# Patient Record
Sex: FEMALE | Race: WHITE | NOT HISPANIC OR LATINO | Employment: OTHER | URBAN - METROPOLITAN AREA
[De-identification: names, ages, dates, MRNs, and addresses within clinical notes are randomized per-mention and may not be internally consistent; named-entity substitution may affect disease eponyms.]

---

## 2019-05-07 ENCOUNTER — TELEPHONE (OUTPATIENT)
Dept: ENDOCRINOLOGY | Facility: CLINIC | Age: 74
End: 2019-05-07

## 2019-05-07 ENCOUNTER — OFFICE VISIT (OUTPATIENT)
Dept: ENDOCRINOLOGY | Facility: CLINIC | Age: 74
End: 2019-05-07
Payer: COMMERCIAL

## 2019-05-07 VITALS
HEART RATE: 90 BPM | HEIGHT: 67 IN | SYSTOLIC BLOOD PRESSURE: 110 MMHG | DIASTOLIC BLOOD PRESSURE: 66 MMHG | WEIGHT: 273.13 LBS | BODY MASS INDEX: 42.87 KG/M2

## 2019-05-07 DIAGNOSIS — E78.2 MIXED DYSLIPIDEMIA: ICD-10-CM

## 2019-05-07 DIAGNOSIS — E66.01 MORBID OBESITY: ICD-10-CM

## 2019-05-07 DIAGNOSIS — E11.42 TYPE 2 DIABETES MELLITUS WITH DIABETIC POLYNEUROPATHY, WITHOUT LONG-TERM CURRENT USE OF INSULIN: Primary | ICD-10-CM

## 2019-05-07 DIAGNOSIS — E89.0 POSTABLATIVE HYPOTHYROIDISM: ICD-10-CM

## 2019-05-07 DIAGNOSIS — I10 ESSENTIAL HYPERTENSION: ICD-10-CM

## 2019-05-07 LAB — GLUCOSE SERPL-MCNC: 85 MG/DL (ref 70–110)

## 2019-05-07 PROCEDURE — 99999 PR PBB SHADOW E&M-NEW PATIENT-LVL III: ICD-10-PCS | Mod: PBBFAC,,, | Performed by: INTERNAL MEDICINE

## 2019-05-07 PROCEDURE — 3078F PR MOST RECENT DIASTOLIC BLOOD PRESSURE < 80 MM HG: ICD-10-PCS | Mod: CPTII,S$GLB,, | Performed by: INTERNAL MEDICINE

## 2019-05-07 PROCEDURE — 99204 PR OFFICE/OUTPT VISIT, NEW, LEVL IV, 45-59 MIN: ICD-10-PCS | Mod: S$GLB,,, | Performed by: INTERNAL MEDICINE

## 2019-05-07 PROCEDURE — 82962 GLUCOSE BLOOD TEST: CPT | Mod: S$GLB,,, | Performed by: INTERNAL MEDICINE

## 2019-05-07 PROCEDURE — 3074F SYST BP LT 130 MM HG: CPT | Mod: CPTII,S$GLB,, | Performed by: INTERNAL MEDICINE

## 2019-05-07 PROCEDURE — 82962 POCT GLUCOSE, HAND-HELD DEVICE: ICD-10-PCS | Mod: S$GLB,,, | Performed by: INTERNAL MEDICINE

## 2019-05-07 PROCEDURE — 3078F DIAST BP <80 MM HG: CPT | Mod: CPTII,S$GLB,, | Performed by: INTERNAL MEDICINE

## 2019-05-07 PROCEDURE — 99999 PR PBB SHADOW E&M-NEW PATIENT-LVL III: CPT | Mod: PBBFAC,,, | Performed by: INTERNAL MEDICINE

## 2019-05-07 PROCEDURE — 3074F PR MOST RECENT SYSTOLIC BLOOD PRESSURE < 130 MM HG: ICD-10-PCS | Mod: CPTII,S$GLB,, | Performed by: INTERNAL MEDICINE

## 2019-05-07 PROCEDURE — 99204 OFFICE O/P NEW MOD 45 MIN: CPT | Mod: S$GLB,,, | Performed by: INTERNAL MEDICINE

## 2019-05-07 RX ORDER — ASPIRIN 81 MG/1
81 TABLET ORAL DAILY
COMMUNITY

## 2019-05-07 RX ORDER — GLIPIZIDE 5 MG/1
5 TABLET ORAL 2 TIMES DAILY WITH MEALS
COMMUNITY

## 2019-05-07 RX ORDER — ATORVASTATIN CALCIUM 40 MG/1
40 TABLET, FILM COATED ORAL DAILY
COMMUNITY

## 2019-05-07 RX ORDER — LEVOTHYROXINE SODIUM 25 UG/1
25 TABLET ORAL
COMMUNITY

## 2019-05-07 RX ORDER — TRAMADOL HYDROCHLORIDE 50 MG/1
50 TABLET ORAL EVERY 6 HOURS PRN
COMMUNITY

## 2019-05-07 RX ORDER — LEVOTHYROXINE SODIUM 200 UG/1
200 TABLET ORAL
COMMUNITY

## 2019-05-07 RX ORDER — PANTOPRAZOLE SODIUM 40 MG/1
40 TABLET, DELAYED RELEASE ORAL DAILY
COMMUNITY

## 2019-05-07 RX ORDER — FUROSEMIDE 40 MG/1
40 TABLET ORAL DAILY
Refills: 0 | COMMUNITY
Start: 2019-04-16

## 2019-05-07 RX ORDER — ALLOPURINOL 100 MG/1
100 TABLET ORAL DAILY
COMMUNITY

## 2019-05-07 RX ORDER — METOPROLOL SUCCINATE 25 MG/1
25 TABLET, EXTENDED RELEASE ORAL DAILY
COMMUNITY

## 2019-05-07 NOTE — PROGRESS NOTES
Subjective:      Patient ID: Little Greene is a 73 y.o. female.    Chief Complaint:  Diabetes      History of Present Illness  Lives in a nursing home   She does not know why she is here  She tells she she was recently admitted to  and was transfused ... She was dx with liver cirrhosis - got 10 l drained   I don't have any records       With regards to the diabetes:    Diagnosed: 1996   Known complications:  None, that she was aware      Current regimen:  Glipizide 5 mg bid   Missed doses?  None     Failed treatments:   Does not recall being on  metformin or any other meds        # times a day testing 1-2 x day   Log reviewed:  none      Hypoglycemic event? None        Diabetes Management Status    Statin: Taking  ACE/ARB: Not taking    Screening or Prevention Patient's value Goal Complete/Controlled?   HgA1C Testing and Control   No results found for: HGBA1C   Annually/Less than 8% No   Lipid profile Most Recent Lipid Panel Health Maintenance Topic Completion: Not Found Annually No   LDL control No results found for: LDLCALC Annually/Less than 100 mg/dl  No   Nephropathy screening No results found for: LABMICR  No results found for: PROTEINUA Annually No   Blood pressure BP Readings from Last 1 Encounters:   05/07/19 110/66    Less than 140/90 Yes   Dilated retinal exam Most Recent Eye Exam Date: Not Found Annually Yes   Foot exam   Most Recent Foot Exam Date: Not Found Annually Yes       Last eye exam: > 1 year. 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   Eyes: Negative for visual disturbance.   Respiratory: Negative for shortness of breath.    Cardiovascular: Negative for chest pain.   Gastrointestinal: Negative for abdominal pain.   Musculoskeletal: Negative for arthralgias.   Skin: Negative for wound.   Neurological: Negative for headaches.   Hematological: Does not bruise/bleed easily.   Psychiatric/Behavioral: Negative for sleep disturbance.       Objective:   Physical Exam   Constitutional: She appears well-developed.   HENT:   Right Ear: External ear normal.   Left Ear: External ear normal.   Nose: Nose normal.   Hearing normal  Dentition good    Neck: No tracheal deviation present. No thyromegaly present.   Cardiovascular: Normal rate.   No murmur heard.  Pulmonary/Chest: Effort normal and breath sounds normal.   Abdominal: Soft. There is no tenderness. No hernia.   Musculoskeletal: She exhibits edema.   Gait normal  No clubbing or cyanosis noted   Neurological: She displays normal reflexes. No cranial nerve deficit.   Skin: No rash noted.   No nodules       Psychiatric: She has a normal mood and affect. Judgment normal.   Vitals reviewed.    Obese in a wheelchair  Feet no cuts or  scratches  Shoes appropriate  sensation decreased to vibration      Body mass index is 42.78 kg/m².    Lab Review:   No results found for: HGBA1C  No results found for: CHOL, HDL, LDLCALC, TRIG, CHOLHDL  No results found for: NA, K, CL, CO2, GLU, BUN, CREATININE, CALCIUM, PROT, ALBUMIN, BILITOT, ALKPHOS, AST, ALT, ANIONGAP, ESTGFRAFRICA, EGFRNONAA, TSH    Assessment and Plan     Problem List Items Addressed This Visit        Unprioritized    Type 2 diabetes mellitus with diabetic polyneuropathy, without long-term current use of insulin - Primary    Current Assessment & Plan     Reviewed goals of therapy are to get the best control we can without hypoglycemia    Refer to education  Refer to podiatry  Needs eye exam (she has her own)   Labs today     Medication  changes:  None  Low threshold to decrease RICHARDSON to qam if ANY hypos  Next meds to add consider DPP4I or incretin         -Advised occasional self blood glucose monitoring.        -Hypoglycemia precautions discussed.     -Close adherence to lifestyle changes recommended.      -Periodic follow ups for eye evaluations, foot care and dental care suggested.    rtc prn                Relevant Medications    glipiZIDE (GLUCOTROL) 5 MG tablet    Essential hypertension    Current Assessment & Plan     Controlled  Can consider ARB as next line            Mixed dyslipidemia    Current Assessment & Plan     On statin         Postablative hypothyroidism    Current Assessment & Plan     Labs today  Goal nl tsh     Check bmd            Morbid obesity    Current Assessment & Plan     Refer to education                Labs to determine if f/u needed otherwise recs as above to pcp

## 2019-05-07 NOTE — TELEPHONE ENCOUNTER
TSH is a bit too high meaning .... Defer to pcp but consider Increasing the thyroid hormone by 25 mcg daily     Cr is elevated - not sure if this is new.     Again if there ia ANY hypoglycemia - low threshold for decreasing the glipizide.  If needed can add  Tradjenta, or victoza     Start over the counter vitamin D 2000 iu a day since  levels were low.         f/u as needed

## 2019-05-07 NOTE — ASSESSMENT & PLAN NOTE
Reviewed goals of therapy are to get the best control we can without hypoglycemia    Refer to education  Refer to podiatry  Needs eye exam (she has her own)   Labs today     Medication changes:  None  Low threshold to decrease RICHARDSON to qam if ANY hypos  Next meds to add consider DPP4I or incretin         -Advised occasional self blood glucose monitoring.        -Hypoglycemia precautions discussed.     -Close adherence to lifestyle changes recommended.      -Periodic follow ups for eye evaluations, foot care and dental care suggested.    rtc prn